# Patient Record
Sex: MALE | Race: BLACK OR AFRICAN AMERICAN | NOT HISPANIC OR LATINO
[De-identification: names, ages, dates, MRNs, and addresses within clinical notes are randomized per-mention and may not be internally consistent; named-entity substitution may affect disease eponyms.]

---

## 2017-06-19 ENCOUNTER — APPOINTMENT (OUTPATIENT)
Dept: ORTHOPEDIC SURGERY | Facility: CLINIC | Age: 62
End: 2017-06-19

## 2021-08-05 ENCOUNTER — APPOINTMENT (OUTPATIENT)
Dept: ORTHOPEDIC SURGERY | Facility: CLINIC | Age: 66
End: 2021-08-05
Payer: MEDICARE

## 2021-08-05 VITALS
HEIGHT: 70 IN | SYSTOLIC BLOOD PRESSURE: 157 MMHG | BODY MASS INDEX: 27.92 KG/M2 | DIASTOLIC BLOOD PRESSURE: 78 MMHG | WEIGHT: 195 LBS | HEART RATE: 58 BPM

## 2021-08-05 DIAGNOSIS — Z82.49 FAMILY HISTORY OF ISCHEMIC HEART DISEASE AND OTHER DISEASES OF THE CIRCULATORY SYSTEM: ICD-10-CM

## 2021-08-05 DIAGNOSIS — Z86.79 PERSONAL HISTORY OF OTHER DISEASES OF THE CIRCULATORY SYSTEM: ICD-10-CM

## 2021-08-05 DIAGNOSIS — Z86.69 PERSONAL HISTORY OF OTHER DISEASES OF THE NERVOUS SYSTEM AND SENSE ORGANS: ICD-10-CM

## 2021-08-05 DIAGNOSIS — Z83.438 FAMILY HISTORY OF OTHER DISORDER OF LIPOPROTEIN METABOLISM AND OTHER LIPIDEMIA: ICD-10-CM

## 2021-08-05 DIAGNOSIS — Z87.39 PERSONAL HISTORY OF OTHER DISEASES OF THE MUSCULOSKELETAL SYSTEM AND CONNECTIVE TISSUE: ICD-10-CM

## 2021-08-05 DIAGNOSIS — Z86.2 PERSONAL HISTORY OF DISEASES OF THE BLOOD AND BLOOD-FORMING ORGANS AND CERTAIN DISORDERS INVOLVING THE IMMUNE MECHANISM: ICD-10-CM

## 2021-08-05 DIAGNOSIS — Z83.3 FAMILY HISTORY OF DIABETES MELLITUS: ICD-10-CM

## 2021-08-05 DIAGNOSIS — Z72.89 OTHER PROBLEMS RELATED TO LIFESTYLE: ICD-10-CM

## 2021-08-05 DIAGNOSIS — Z86.39 PERSONAL HISTORY OF OTHER ENDOCRINE, NUTRITIONAL AND METABOLIC DISEASE: ICD-10-CM

## 2021-08-05 PROCEDURE — 99204 OFFICE O/P NEW MOD 45 MIN: CPT

## 2021-08-05 PROCEDURE — 73502 X-RAY EXAM HIP UNI 2-3 VIEWS: CPT | Mod: RT

## 2021-08-05 RX ORDER — ATENOLOL 50 MG/1
50 TABLET ORAL
Refills: 0 | Status: ACTIVE | COMMUNITY

## 2021-08-05 RX ORDER — COLESEVELAM HYDROCHLORIDE 625 MG/1
625 TABLET, FILM COATED ORAL
Refills: 0 | Status: ACTIVE | COMMUNITY

## 2021-08-05 RX ORDER — BENAZEPRIL HYDROCHLORIDE 20 MG/1
20 TABLET, FILM COATED ORAL
Refills: 0 | Status: ACTIVE | COMMUNITY

## 2021-08-05 NOTE — ADDENDUM
[FreeTextEntry1] : This note was authored by Whitney Overton working as a medical scribe for Dr. Jabier Rees. The note was reviewed, edited, and revised by Dr. Jabier Rees whom is in agreement with the exam findings, imaging findings, and treatment plan. 08/05/2021

## 2021-08-05 NOTE — DISCUSSION/SUMMARY
[de-identified] : The patient is a 66 year old male who presents with right hip pain. Given the significant degeneration of his lumbar spine seen on x-ray, I believe most of his pain stems from his spine. Referral to physiatry for further spinal evaluation was provided. He does have some lateral hip pain consistent with greater trochanteric bursitis as well. Patient may continue taking antiinflammatories as prescribed by his rheumatologist. A prescription for physical therapy was provided. The patient was instructed to learn and continue a home stretching routine to prevent bursitis recurrence.

## 2021-08-05 NOTE — PHYSICAL EXAM
[de-identified] : The patient appears well nourished  and in no apparent distress.  The patient is alert and oriented to person, place, and time.   Affect and mood appear normal. The head is normocephalic and atraumatic.  The eyes reveal normal sclera and extra ocular muscles are intact. The tongue is midline with no apparent lesions.  Skin shows normal turgor with no evidence of eczema or psoriasis.  No respiratory distress noted.  Sensation grossly intact.\par   [de-identified] : Exam of the right hip shows lateral hip pain with hip flexion of 100 degrees, no pain with hip external rotation of 40 degrees, hip internal rotation of 25 degrees. Tenderness to palpation over the GT bursa, less tenderness over the gluteal musculature, no pain over the ischial tuberosity.  SLR without difficulty. 5/5 motor strength bilaterally distally. Sensation intact distally. [de-identified] : X-ray: AP view of the pelvis and 2 views of the right hip demonstrate well preserved hip joint space. There is significant arthritis of the right lumbar spine seen.

## 2021-08-05 NOTE — HISTORY OF PRESENT ILLNESS
[de-identified] : Patient presents for evaluation of right hip/buttock pain.  He notes his pain has been longstanding and is now worsening.  His pain is localized posterolaterally.  He denies any pain radiating down the leg.  Patient denies any groin pain at this time.  He admits to history of lower back pain.  His symptoms are worse with long periods of standing.  He has not had any injections.  Patient has tried physical therapy without significant improvement.  Patient takes anti-inflammatories and Tylenol without significant improvement.\par Surgical history includes rheumatoid arthritis.  He is unsure which medications he takes for this.

## 2021-08-05 NOTE — CONSULT LETTER
[Dear  ___] : Dear  [unfilled], [Consult Letter:] : I had the pleasure of evaluating your patient, [unfilled]. [Please see my note below.] : Please see my note below. [Consult Closing:] : Thank you very much for allowing me to participate in the care of this patient.  If you have any questions, please do not hesitate to contact me. [Sincerely,] : Sincerely, [FreeTextEntry2] : ABDIAZIZ LATHAM MD\par  [FreeTextEntry3] : Jabier Rees MD\par Chief of Joint Replacement\par [facility] \par [facility]\par \par

## 2021-09-01 ENCOUNTER — OUTPATIENT (OUTPATIENT)
Dept: OUTPATIENT SERVICES | Facility: HOSPITAL | Age: 66
LOS: 1 days | End: 2021-09-01
Payer: COMMERCIAL

## 2021-09-01 ENCOUNTER — APPOINTMENT (OUTPATIENT)
Dept: PHYSICAL MEDICINE AND REHAB | Facility: CLINIC | Age: 66
End: 2021-09-01
Payer: MEDICARE

## 2021-09-01 ENCOUNTER — RESULT REVIEW (OUTPATIENT)
Age: 66
End: 2021-09-01

## 2021-09-01 VITALS
RESPIRATION RATE: 14 BRPM | SYSTOLIC BLOOD PRESSURE: 135 MMHG | HEART RATE: 58 BPM | BODY MASS INDEX: 27.63 KG/M2 | WEIGHT: 193 LBS | DIASTOLIC BLOOD PRESSURE: 78 MMHG | HEIGHT: 70 IN

## 2021-09-01 DIAGNOSIS — Z86.39 PERSONAL HISTORY OF OTHER ENDOCRINE, NUTRITIONAL AND METABOLIC DISEASE: ICD-10-CM

## 2021-09-01 DIAGNOSIS — M47.816 SPONDYLOSIS WITHOUT MYELOPATHY OR RADICULOPATHY, LUMBAR REGION: ICD-10-CM

## 2021-09-01 DIAGNOSIS — Z78.9 OTHER SPECIFIED HEALTH STATUS: ICD-10-CM

## 2021-09-01 PROCEDURE — 99204 OFFICE O/P NEW MOD 45 MIN: CPT

## 2021-09-01 PROCEDURE — 72100 X-RAY EXAM L-S SPINE 2/3 VWS: CPT | Mod: 26

## 2021-09-01 NOTE — PHYSICAL EXAM
[FreeTextEntry1] : PE:\par Constitutional: In NAD, calm and cooperative\par HEENT: NCAT, Anicteric sclera, no lid-lag\par Cardio: Extremities appear pink and well perfused, no peripheral edema\par Respiratory: Normal respiratory effort on room air, no accessory muscle use\par Skin: no rashes seen on exposed skin, no visible abrasions\par Psych: Normal affect, intact judgment and insight\par Neuro: Awake, alert and oriented x 3, see below for focused neurological exam\par MSK (Back)\par 	Inspection: no gross swelling identified\par 	Palpation: Tenderness of the right lower lumbar paraspinals/upper buttocks area\par 	ROM: Pain at end lumbar extension>flexion\par 	Strength: 5/5 strength in bilateral lower extremities\par 	Reflexes: 2+ Patella reflex bilaterally, 2+ Achilles reflex bilaterally, negative clonus bilaterally\par 	Sensation: Intact to light touch in bilateral lower extremities\par Special tests:\par SLR:negative bilaterally\par FRANCINE:negative bilaterally\par FADIR: negative bilaterally\par Facet loading: positive on right

## 2021-09-01 NOTE — HISTORY OF PRESENT ILLNESS
[FreeTextEntry1] : Mr. DANIELA TABARES is a 66 year old male who presents with low back pain. Patient was referred by Dr. Rees. \par \par Location:R hip/buttock/low back\par Onset:Chronic, years, no specific triggering event\par Provocation/Palliative:Worse with prolonged standing/activity, better with rest\par Quality:Aching, but can be sharp\par Radiation:No radiation down leg\par Severity:minimal with rest, can be moderate with standing\par Timing:Gradually worsening with time\par \par Denies any associated numbness. Denies any associated leg weakness. Denies any loss of bowel/bladder control or any groin numbness.\par Previous medications trialed:Tylenol, NSAIDs\par Previous procedures relevant to complaint:None\par Conservative therapy tried?:Will be starting PT this week

## 2021-09-01 NOTE — DATA REVIEWED
[FreeTextEntry1] : X-ray Pelvis 8/5/21 taken by [facility] reviewed by me: moderate degenerative changes of lower lumbar spine.

## 2021-09-01 NOTE — ASSESSMENT
[FreeTextEntry1] : Mr. DANIELA TABARES is a 66 year old male who presents with chronic R sided lower back/buttock pain, consistent with lumbar facet joint pain with degenerative changes seen on pelvis X-ray. Denies any radicular pain at this time. Denies any red flag signs. Will recommend:\par - [facility] notes reviewed\par - Dedicated X-ray LSpine to evaluate for degenerative changes\par - Patient to start PT this week as given by Dr. Rees\par - Patient to take OTC NSAIDs/Tylenol PRN, he rarely needs to take medication at this time. Patient aware of risks and side effects. \par \par RTC in 1 month. If no significant improvement, can consider advanced imaging of lumbar spine. Patient aware of red flag signs including any changes to their bowel/bladder control, groin numbness or new weakness. Patient knows to seek immediate attention by calling 911 or going to nearest ER if these symptoms appear.

## 2021-10-06 ENCOUNTER — APPOINTMENT (OUTPATIENT)
Dept: PHYSICAL MEDICINE AND REHAB | Facility: CLINIC | Age: 66
End: 2021-10-06
Payer: MEDICARE

## 2021-10-06 VITALS
RESPIRATION RATE: 14 BRPM | BODY MASS INDEX: 27.92 KG/M2 | HEIGHT: 70 IN | WEIGHT: 195 LBS | HEART RATE: 54 BPM | DIASTOLIC BLOOD PRESSURE: 81 MMHG | SYSTOLIC BLOOD PRESSURE: 159 MMHG

## 2021-10-06 DIAGNOSIS — M25.551 PAIN IN RIGHT HIP: ICD-10-CM

## 2021-10-06 PROCEDURE — 99214 OFFICE O/P EST MOD 30 MIN: CPT

## 2021-10-06 RX ORDER — NAPROXEN 500 MG/1
500 TABLET ORAL
Qty: 28 | Refills: 0 | Status: ACTIVE | COMMUNITY
Start: 2021-10-06 | End: 1900-01-01

## 2021-10-06 NOTE — HISTORY OF PRESENT ILLNESS
[FreeTextEntry1] : Mr. DANIELA TABARES is a 66 year old  male who presents for follow up. At last visit, X-ray of LSpine was ordered, he was started on PT. He feels about the same, no significant improvement, pain has persisted. No new symptoms. He occasionally takes a medication from rheumatologist but is unsure which it is. \par \par Location:R hip/buttock/low back\par Onset:Chronic, years, no specific triggering event\par Provocation/Palliative:Worse with prolonged standing/activity, better with rest\par Quality:Aching, but can be sharp\par Radiation:No radiation down leg\par Severity:minimal with rest, can be moderate with standing\par Timing:Gradually worsening with time\par \par No bowel/bladder changes. No groin numbness.

## 2021-10-06 NOTE — ASSESSMENT
[FreeTextEntry1] : Mr. DANIELA TABARES is a 66 year old male who presents with chronic R sided lower back/buttock pain, consistent with lumbar facet joint pain with degenerative changes seen on pelvis X-ray. Denies any radicular pain at this time. Denies any red flag signs. Will recommend:\par - Given pain persisted 6+ weeks despite conservative care under physician guidelines, will order MRI L Spine\par - Patient to continue PT\par - Patient to find out which medication he takes from his rheumatologist. In meantime will give Naproxen 500mg BID PRN. Patient advised on cardiac/gi/renal side effects. Patient encouraged to take medication with food and not with other NSAIDs. Patient will call me once he figures out what medication the rheumatologist gives him before taking the Naproxen. Patient in agreement with plan. \par \par RTC in 1 month.. Patient aware of red flag signs including any changes to their bowel/bladder control, groin numbness or new weakness. Patient knows to seek immediate attention by calling 911 or going to nearest ER if these symptoms appear.

## 2021-10-06 NOTE — DATA REVIEWED
[FreeTextEntry1] : \par  XR LUMBAR SPINE AP AND LATERAL 2 OR 3 VIEWS             Final\par \par No Documents Attached\par \par \par \par \par   EXAM:  XR LS SPINE AP LAT 2-3 VIEWS\par \par \par PROCEDURE DATE:  09/01/2021\par \par \par \par INTERPRETATION:  CLINICAL INDICATION: right lower back pain\par \par EXAM:\par AP lateral lumbosacral spine from 9/1/2021 at 1403. Compared to appearance on abdomen/pelvis CT from 9/20/2009.\par \par IMPRESSION:\par No compression fractures, spondylolistheses, or spondylolysis defects.\par \par Notable L4-L5 level disc margin osteophytes. Otherwise preserved intervertebral disc spaces.\par \par Unremarkable SI joints and partially visualized hips.\par \par No lytic or blastic lesions.\par \par --- End of Report ---\par \par \par \par \par \par \par LAURA MUSE MD; Attending Radiologist\par This document has been electronically signed. Sep  1 2021  4:51PM\par \par  \par \par  Ordered by: CRISTINA CASTILLO       Collected/Examined: 01Sep2021 02:03PM       \par Verified by: CRISTINA CASTILLO 01Sep2021 05:07PM       \par  Result Communication: No patient communication needed at this time;\par Stage: Final       \par  Performed at: [facility]       Resulted: 01Sep2021 04:50PM       Last Updated: 01Sep2021 05:07PM       Accession: [id number]

## 2021-10-20 ENCOUNTER — APPOINTMENT (OUTPATIENT)
Dept: MRI IMAGING | Facility: CLINIC | Age: 66
End: 2021-10-20
Payer: MEDICARE

## 2021-10-20 ENCOUNTER — OUTPATIENT (OUTPATIENT)
Dept: OUTPATIENT SERVICES | Facility: HOSPITAL | Age: 66
LOS: 1 days | End: 2021-10-20

## 2021-10-20 DIAGNOSIS — M54.16 RADICULOPATHY, LUMBAR REGION: ICD-10-CM

## 2021-10-20 PROCEDURE — 72148 MRI LUMBAR SPINE W/O DYE: CPT | Mod: 26

## 2021-11-10 ENCOUNTER — APPOINTMENT (OUTPATIENT)
Dept: PHYSICAL MEDICINE AND REHAB | Facility: CLINIC | Age: 66
End: 2021-11-10
Payer: COMMERCIAL

## 2021-11-10 VITALS
HEIGHT: 70 IN | BODY MASS INDEX: 27.92 KG/M2 | WEIGHT: 195 LBS | SYSTOLIC BLOOD PRESSURE: 110 MMHG | RESPIRATION RATE: 14 BRPM | DIASTOLIC BLOOD PRESSURE: 80 MMHG | HEART RATE: 60 BPM

## 2021-11-10 PROCEDURE — 99214 OFFICE O/P EST MOD 30 MIN: CPT

## 2021-11-10 NOTE — HISTORY OF PRESENT ILLNESS
[FreeTextEntry1] : Mr. DANIELA TABARES is a 66 year old  male who presents for follow up. At last visit, patient was ordered an MRI L Spine, continued on PT and found out the medication he takes from rheumatology is duexis but since it wasn't helping, he tried Naproxen instead. He overall feels a little better, takes Naproxen occasionally. \par \par Location:R hip/buttock/low back\par Onset:Chronic, years, no specific triggering event\par Provocation/Palliative:Worse with prolonged standing/activity, better with rest\par Quality:Aching, but can be sharp\par Radiation:No radiation down leg\par Severity:minimal with rest, can be moderate with standing\par Timing:Gradually worsening with time\par \par No bowel/bladder changes. No groin numbness.

## 2021-11-10 NOTE — DATA REVIEWED
[FreeTextEntry1] : \par   MR Lumbar Spine No Cont             Final\par \par No Documents Attached\par \par \par Result Annotated 25Oct2021 09:36AM by CRISTINA CASTILLO\par \par \par Discussed with patient. He will see PCP regarding renal cysts. Will f/u with me in clinic\par \par \par   EXAM:  MR SPINE LUMBAR\par \par \par PROCEDURE DATE:  10/20/2021\par \par \par \par INTERPRETATION:  EXAMINATION: MR LUMBAR SPINE\par \par CLINICAL INDICATION: Persistent right-sided low back pain.\par \par TECHNIQUE: Multi-planar, multi-sequence MR of the lumbar spine was performed without intravenous contrast.\par \par COMPARISON: CT lumbar spine dated 9/20/2009 and radiographs lumbar spine dated 9/1/2021\par \par INTERPRETATION:\par \par BONES AND BONE MARROW:  There is no evidence of fracture. There is an unchanged density within the L1 vertebra 2009, likely to represent a bone island. There is L3 and L4 small hemangiomas present.\par INTERVERTEBRAL DISCS:  There is disc desiccation at L3-4 through L5-S1. Other findings as below.\par ALIGNMENT:  The spinal alignment is maintained.\par CONUS AND CAUDA EQUINA: The conus medullaris is normal in size, signal and location.  The conus terminates at the L1 level.\par EXTRASPINAL: There is no epidural mass or fluid collection. There is severe bilateral paraspinal muscle atrophy. There are bilateral renal cysts.\par \par Evaluation of individual lumbar disc space levels demonstrates the following:\par \par L1/L2, There is no significant spinal canal or neural foraminal stenosis.\par L2/L3, broad-based posterior disc herniation is present with mild facet arthropathy. There is mild bilateral neural foraminal stenosis. No central stenosis.\par L3/L4, broad-based posterior disc herniation is present with moderate facet arthropathy. There is an annular tear centrally. There is moderate bilateral neural foraminal stenosis. Mild central stenosis.\par L4/L5, broad-based posterior disc herniation is present with moderate to severe facet arthropathy. There is severe right and moderate left neural foraminal stenosis. Severe central stenosis.\par L5/S1, broad-based posterior disc herniation is present with moderate facet arthropathy. There is a right paracentral annular tear. There is moderate bilateral neural foraminal stenosis. Moderate central stenosis.\par \par IMPRESSION:\par Multilevel lumbar spondylosis notable at L3-4, L4-5, and L5-S1.\par \par At L3/L4 there is moderate bilateral neural foraminal stenosis and mild central stenosis.\par \par At L4/L5, there is severe right and moderate left neural foraminal stenosis and severe central stenosis.\par \par At L5/S1, there is moderate bilateral neural foraminal stenosis and moderate central stenosis.\par \par Severe bilateral paraspinal muscle atrophy.\par \par \par --- End of Report ---\par \par \par \par \par \par \par SHLOMIT GOLDBERG-STEIN MD; Attending Radiologist\par This document has been electronically signed. Oct 22 2021  5:21PM\par \par  \par \par  Ordered by: CRISTINA CASTILLO       Collected/Examined: 20Oct2021 10:29AM       \par Verified by: CRISTINA CASTILLO 25Oct2021 09:36AM       \par  Result Communication: Discussed results with patient;\par Stage: Final       \par  Performed at: [facility]       Resulted: 22Oct2021 04:41PM       Last Updated: 25Oct2021 09:36AM       Accession: [id number]

## 2021-11-10 NOTE — ASSESSMENT
[FreeTextEntry1] : Mr. DANIELA TABARES is a 66 year old male who presents with chronic R sided lower back/buttock pain, consistent with lumbar facet joint pain with degenerative changes seen on MRI. There is also likely a myofascial component.  Although patient has areas of severe stenosis, he denies any radicular pain or neurogenic claudication at this time. Denies any red flag signs. Will recommend:\par - Patient to continue PT but will now focus more on core strengthening\par - May continue Naproxen PRN. Patient aware of risks/benefits and knows not to take other NSAIDs with it. \par - Offered patient a consultation with a spine surgeon but he does not want to speak to a spine surgeon at this time. Risks and benefits explained to patient. \par \par RTC in 1 month. Patient aware of red flag signs including any changes to their bowel/bladder control, groin numbness or new weakness. Patient knows to seek immediate attention by calling 911 or going to nearest ER if these symptoms appear.

## 2021-11-10 NOTE — PHYSICAL EXAM
[FreeTextEntry1] : PE:\par Constitutional: In NAD, calm and cooperative\par HEENT: NCAT, Anicteric sclera, no lid-lag\par Cardio: Extremities appear pink and well perfused, no peripheral edema\par Respiratory: Normal respiratory effort on room air, no accessory muscle use\par Skin: no rashes seen on exposed skin, no visible abrasions\par Psych: Normal affect, intact judgment and insight\par Neuro: Awake, alert and oriented x 3, see below for focused neurological exam\par MSK (Back)\par 	Inspection: no gross swelling identified\par 	Palpation: Mild tenderness of the right lower lumbar paraspinals/upper buttocks area\par 	ROM: Pain at end lumbar extension>flexion\par 	Strength: 5/5 strength in bilateral lower extremities\par 	Reflexes: 2+ Patella reflex bilaterally, 2+ Achilles reflex bilaterally, negative clonus bilaterally\par 	Sensation: Intact to light touch in bilateral lower extremities\par Special tests:\par SLR:negative bilaterally\par FRANCINE:negative bilaterally\par FADIR: negative bilaterally\par Facet loading: positive on right

## 2021-12-10 ENCOUNTER — APPOINTMENT (OUTPATIENT)
Dept: PHYSICAL MEDICINE AND REHAB | Facility: CLINIC | Age: 66
End: 2021-12-10

## 2022-01-21 ENCOUNTER — APPOINTMENT (OUTPATIENT)
Dept: PHYSICAL MEDICINE AND REHAB | Facility: CLINIC | Age: 67
End: 2022-01-21
Payer: MEDICARE

## 2022-01-21 VITALS
RESPIRATION RATE: 12 BRPM | BODY MASS INDEX: 27.63 KG/M2 | SYSTOLIC BLOOD PRESSURE: 130 MMHG | HEIGHT: 70 IN | DIASTOLIC BLOOD PRESSURE: 75 MMHG | WEIGHT: 193 LBS | HEART RATE: 55 BPM

## 2022-01-21 PROCEDURE — 99213 OFFICE O/P EST LOW 20 MIN: CPT | Mod: GC

## 2022-01-24 NOTE — PHYSICAL EXAM
[FreeTextEntry1] : PE:\par Constitutional: In NAD, calm and cooperative\par HEENT: NCAT, Anicteric sclera, no lid-lag\par Cardio: Extremities appear pink and well perfused, no peripheral edema\par Respiratory: Normal respiratory effort on room air, no accessory muscle use\par Skin: no rashes seen on exposed skin, no visible abrasions\par Psych: Normal affect, intact judgment and insight\par Neuro: Awake, alert and oriented x 3, see below for focused neurological exam\par MSK (Back)\par 	Inspection: no gross swelling identified\par 	Palpation: Mild tenderness of the right lower lumbar paraspinals/upper buttocks area\par 	ROM: Mild pain at end lumbar extension>flexion\par 	Strength: 5/5 strength in bilateral lower extremities\par 	Reflexes: 2+ Patella reflex bilaterally, 2+ Achilles reflex bilaterally, negative clonus bilaterally\par 	Sensation: Intact to light touch in bilateral lower extremities\par Special tests:\par SLR:negative bilaterally\par FRANCINE:negative bilaterally\par FADIR: negative bilaterally\par Facet loading: positive on right

## 2022-01-24 NOTE — ASSESSMENT
[FreeTextEntry1] : Mr. DANIELA TABARES is a 66 year old male who presents with chronic R sided lower back/buttock pain, consistent with lumbar facet joint pain with degenerative changes seen on MRI. There is also likely a myofascial component.  Although patient has areas of severe stenosis, he denies any radicular pain or neurogenic claudication at this time. Denies any red flag signs. Clinically improving with conservative management.\par \par Will recommend:\par - Patient to continue PT but will now focus more on core strengthening, then transition to HEP.\par - May continue Naproxen PRN. Patient aware of risks/benefits and knows not to take other NSAIDs with it. \par - Patient still not interested in surgical consultation for his stenosis. \par \par RTC 5-6 weeks. Patient aware of red flag signs including any changes to their bowel/bladder control, groin numbness or new weakness. Patient knows to seek immediate attention by calling 911 or going to nearest ER if these symptoms appear.

## 2022-01-24 NOTE — HISTORY OF PRESENT ILLNESS
[FreeTextEntry1] : Mr. DANIELA TABARES is a 66 year old  male who presents for follow up. At last visit, continued on PT.  He overall feels much better, takes Naproxen occasionally.  The one place he occasionally feels pain in the right hip, but only if he stands for a prolonged period of time. Denies any new symptoms. \par \par Location:R hip/buttock/low back\par Onset:Chronic, years, no specific triggering event\par Provocation/Palliative:Worse with prolonged standing/activity, better with rest\par Quality:Aching, but can be sharp\par Radiation:No radiation down leg\par Severity:minimal with rest, can be mild with standing\par Timing:Improving with PT\par \par No bowel/bladder changes. No groin numbness.

## 2022-01-24 NOTE — DATA REVIEWED
[FreeTextEntry1] : \par   MR Lumbar Spine No Cont             Final\par \par No Documents Attached\par \par \par Result Annotated 25Oct2021 09:36AM by CRISTINA CASTILLO\par \par \par Discussed with patient. He will see PCP regarding renal cysts. Will f/u with me in clinic\par \par \par   EXAM:  MR SPINE LUMBAR\par \par \par PROCEDURE DATE:  10/20/2021\par \par \par \par INTERPRETATION:  EXAMINATION: MR LUMBAR SPINE\par \par CLINICAL INDICATION: Persistent right-sided low back pain.\par \par TECHNIQUE: Multi-planar, multi-sequence MR of the lumbar spine was performed without intravenous contrast.\par \par COMPARISON: CT lumbar spine dated 9/20/2009 and radiographs lumbar spine dated 9/1/2021\par \par INTERPRETATION:\par \par BONES AND BONE MARROW:  There is no evidence of fracture. There is an unchanged density within the L1 vertebra 2009, likely to represent a bone island. There is L3 and L4 small hemangiomas present.\par INTERVERTEBRAL DISCS:  There is disc desiccation at L3-4 through L5-S1. Other findings as below.\par ALIGNMENT:  The spinal alignment is maintained.\par CONUS AND CAUDA EQUINA: The conus medullaris is normal in size, signal and location.  The conus terminates at the L1 level.\par EXTRASPINAL: There is no epidural mass or fluid collection. There is severe bilateral paraspinal muscle atrophy. There are bilateral renal cysts.\par \par Evaluation of individual lumbar disc space levels demonstrates the following:\par \par L1/L2, There is no significant spinal canal or neural foraminal stenosis.\par L2/L3, broad-based posterior disc herniation is present with mild facet arthropathy. There is mild bilateral neural foraminal stenosis. No central stenosis.\par L3/L4, broad-based posterior disc herniation is present with moderate facet arthropathy. There is an annular tear centrally. There is moderate bilateral neural foraminal stenosis. Mild central stenosis.\par L4/L5, broad-based posterior disc herniation is present with moderate to severe facet arthropathy. There is severe right and moderate left neural foraminal stenosis. Severe central stenosis.\par L5/S1, broad-based posterior disc herniation is present with moderate facet arthropathy. There is a right paracentral annular tear. There is moderate bilateral neural foraminal stenosis. Moderate central stenosis.\par \par IMPRESSION:\par Multilevel lumbar spondylosis notable at L3-4, L4-5, and L5-S1.\par \par At L3/L4 there is moderate bilateral neural foraminal stenosis and mild central stenosis.\par \par At L4/L5, there is severe right and moderate left neural foraminal stenosis and severe central stenosis.\par \par At L5/S1, there is moderate bilateral neural foraminal stenosis and moderate central stenosis.\par \par Severe bilateral paraspinal muscle atrophy.\par \par \par --- End of Report ---\par \par \par \par \par \par \par SHLOMIT GOLDBERG-STEIN MD; Attending Radiologist\par This document has been electronically signed. Oct 22 2021  5:21PM\par \par  \par \par  Ordered by: CRISTINA CASTILLO       Collected/Examined: 20Oct2021 10:29AM       \par Verified by: CRISTINA CASTILLO 25Oct2021 09:36AM       \par  Result Communication: Discussed results with patient;\par Stage: Final       \par  Performed at: [facility]       Resulted: 22Oct2021 04:41PM       Last Updated: 25Oct2021 09:36AM       Accession: [id number]

## 2022-03-11 ENCOUNTER — APPOINTMENT (OUTPATIENT)
Dept: PHYSICAL MEDICINE AND REHAB | Facility: CLINIC | Age: 67
End: 2022-03-11
Payer: MEDICARE

## 2022-03-11 VITALS
HEIGHT: 70 IN | WEIGHT: 190 LBS | DIASTOLIC BLOOD PRESSURE: 84 MMHG | SYSTOLIC BLOOD PRESSURE: 168 MMHG | RESPIRATION RATE: 14 BRPM | HEART RATE: 54 BPM | BODY MASS INDEX: 27.2 KG/M2

## 2022-03-11 DIAGNOSIS — M79.18 MYALGIA, OTHER SITE: ICD-10-CM

## 2022-03-11 PROCEDURE — 99214 OFFICE O/P EST MOD 30 MIN: CPT

## 2022-03-11 NOTE — ASSESSMENT
[FreeTextEntry1] : Mr. DANIELA TABARES is a 66 year old male who presents with chronic R sided lower back/buttock pain, consistent with lumbar facet joint pain with degenerative changes seen on MRI. There is also likely a myofascial component.  Although patient has areas of severe stenosis, he denies any radicular pain or neurogenic claudication at this time. Denies any red flag signs. Clinically improving with conservative management.\par \par Will recommend:\par - Discussed with patient the risks (including but not limited to bleeding, infection, nerve damage, etc), benefits and alternatives to a medial branch block injection for which patient understands. Will plan for a right L4-5 and L5-S1 MBB. He will need PCP clearance and COVID PCR done. \par - May continue Naproxen PRN. Patient aware of risks/benefits and knows not to take other NSAIDs with it. \par - Patient still not interested in surgical consultation for his stenosis. \par \par Return for procedure. Patient aware of red flag signs including any changes to their bowel/bladder control, groin numbness or new weakness. Patient knows to seek immediate attention by calling 911 or going to nearest ER if these symptoms appear.

## 2022-03-11 NOTE — HISTORY OF PRESENT ILLNESS
[FreeTextEntry1] : Mr. DANIELA TABARES is a 67 year old  male who presents for follow up. At last visit, he was continued on PT, Naproxen PRN. Overall he feels about the same. He has now finished PT, without significant benefit. \par \par Location:R hip/buttock/low back\par Onset:Chronic, years, no specific triggering event\par Provocation/Palliative:Worse with prolonged standing/activity, better with rest\par Quality:Aching, but can be sharp\par Radiation:No radiation down leg\par Severity:minimal with rest, can be mild-moderate with standing\par Timing:Has been stayed about the same. \par \par No bowel/bladder changes. No groin numbness.

## 2022-03-11 NOTE — PHYSICAL EXAM
[FreeTextEntry1] : PE:\par Constitutional: In NAD, calm and cooperative\par HEENT: NCAT, Anicteric sclera, no lid-lag\par Cardio: Extremities appear pink and well perfused, no peripheral edema\par Respiratory: Normal respiratory effort on room air, no accessory muscle use\par Skin: no rashes seen on exposed skin, no visible abrasions\par Psych: Normal affect, intact judgment and insight\par Neuro: Awake, alert and oriented x 3, see below for focused neurological exam\par MSK (Back)\par 	Inspection: no gross swelling identified\par 	Palpation: Mild tenderness of the right lower lumbar paraspinals/upper buttocks area\par 	ROM: Mild pain at end lumbar extension>flexion\par 	Strength: 5/5 strength in bilateral lower extremities\par 	Reflexes: 2+ Patella reflex bilaterally, 2+ Achilles reflex bilaterally, negative clonus bilaterally\par 	Sensation: Intact to light touch in bilateral lower extremities\par Special tests:\par SLR:negative bilaterally\par Facet loading: positive on right

## 2022-04-07 ENCOUNTER — APPOINTMENT (OUTPATIENT)
Dept: PHYSICAL MEDICINE AND REHAB | Facility: AMBULATORY SURGERY CENTER | Age: 67
End: 2022-04-07
Payer: MEDICARE

## 2022-04-07 PROCEDURE — 64493 INJ PARAVERT F JNT L/S 1 LEV: CPT | Mod: RT

## 2022-04-07 PROCEDURE — 64494 INJ PARAVERT F JNT L/S 2 LEV: CPT | Mod: RT

## 2022-05-18 ENCOUNTER — APPOINTMENT (OUTPATIENT)
Dept: PHYSICAL MEDICINE AND REHAB | Facility: CLINIC | Age: 67
End: 2022-05-18
Payer: MEDICARE

## 2022-05-18 PROCEDURE — 99214 OFFICE O/P EST MOD 30 MIN: CPT

## 2022-05-18 NOTE — ASSESSMENT
[FreeTextEntry1] : Mr. DANIELA TABARES is a 66 year old male who presents with chronic R sided lower back/buttock pain, which was thought to be more from his underlying spondylosis but he did not get significant relief from the MBB. Of note, he now complains of more pain down his RLE. Given his significant stenosis on MRI, he may have a radicular component now. Denies any red flag signs. Clinically improving with conservative management.\par \par Will recommend:\par - Discussed with patient the risks (including but not limited to bleeding, infection, nerve damage, etc), benefits and alternatives to an epidural steroid injection for which patient understands. Will plan for a R L5-S1 TFESI. He will need PCP clearance and COVID PCR done. \par - Advised he can take Tylenol PRN for now, up to 3g/day. \par - Patient still not interested in surgical consultation for his stenosis. \par \par Return for procedure. Patient aware of red flag signs including any changes to their bowel/bladder control, groin numbness or new weakness. Patient knows to seek immediate attention by calling 911 or going to nearest ER if these symptoms appear.

## 2022-05-18 NOTE — PHYSICAL EXAM
[FreeTextEntry1] : PE:\par MSK (Back)\par 	Inspection: no gross swelling identified, no erythema or swelling around injection site \par 	Palpation: Mild tenderness of the right lower lumbar paraspinals/upper buttocks area\par 	ROM: Mild pain at end lumbar flexion>extension\par 	Strength: 5/5 strength in bilateral lower extremities\par 	Reflexes: 2+ Patella reflex bilaterally, 2+ Achilles reflex bilaterally, negative clonus bilaterally\par 	Sensation: Intact to light touch in bilateral lower extremities\par Special tests:\par SLR:equivocal on right, negative on left\par Facet loading: positive on right

## 2022-05-18 NOTE — HISTORY OF PRESENT ILLNESS
[FreeTextEntry1] : Mr. DANIELA TABARES is a 67 year old  male who presents for follow up. He is s/p a R L4-5 and L5-S1 MBB on 4/7/22. He only got 40% relief for the first day after procedure. He now says he is back to baseline. He also says his L side has been acting up as well, but not nearly as worse as his R side. \par \par Location:R>L hip/buttock/low back\par Onset:Chronic, years, no specific triggering event\par Provocation/Palliative:Worse with prolonged standing/activity, better with rest\par Quality:Aching, but can be sharp\par Radiation:Can radiate down to his R lateral thigh area\par Severity:minimal with rest, can be mild-moderate with standing\par Timing:Has been stayed about the same. \par \par No bowel/bladder changes. No groin numbness.

## 2022-06-10 ENCOUNTER — EMERGENCY (EMERGENCY)
Facility: HOSPITAL | Age: 67
LOS: 1 days | Discharge: HOME | End: 2022-06-10
Attending: STUDENT IN AN ORGANIZED HEALTH CARE EDUCATION/TRAINING PROGRAM
Payer: MEDICARE

## 2022-06-10 VITALS
WEIGHT: 190.04 LBS | RESPIRATION RATE: 18 BRPM | OXYGEN SATURATION: 98 % | HEIGHT: 71 IN | HEART RATE: 58 BPM | DIASTOLIC BLOOD PRESSURE: 81 MMHG | SYSTOLIC BLOOD PRESSURE: 165 MMHG | TEMPERATURE: 98 F

## 2022-06-10 LAB
APPEARANCE UR: CLEAR — SIGNIFICANT CHANGE UP
BILIRUB UR-MCNC: NEGATIVE — SIGNIFICANT CHANGE UP
COLOR SPEC: YELLOW — SIGNIFICANT CHANGE UP
DIFF PNL FLD: NEGATIVE — SIGNIFICANT CHANGE UP
GLUCOSE UR QL: NEGATIVE MG/DL — SIGNIFICANT CHANGE UP
KETONES UR-MCNC: NEGATIVE — SIGNIFICANT CHANGE UP
LEUKOCYTE ESTERASE UR-ACNC: NEGATIVE — SIGNIFICANT CHANGE UP
NITRITE UR-MCNC: NEGATIVE — SIGNIFICANT CHANGE UP
PH UR: 6 — SIGNIFICANT CHANGE UP (ref 5–8)
PROT UR-MCNC: NEGATIVE — SIGNIFICANT CHANGE UP
SP GR SPEC: 1.01 — SIGNIFICANT CHANGE UP (ref 1.01–1.02)
UROBILINOGEN FLD QL: NEGATIVE MG/DL — SIGNIFICANT CHANGE UP

## 2022-06-10 PROCEDURE — 96372 THER/PROPH/DIAG INJ SC/IM: CPT

## 2022-06-10 PROCEDURE — 99284 EMERGENCY DEPT VISIT MOD MDM: CPT

## 2022-06-10 PROCEDURE — 81003 URINALYSIS AUTO W/O SCOPE: CPT

## 2022-06-10 PROCEDURE — 87086 URINE CULTURE/COLONY COUNT: CPT

## 2022-06-10 PROCEDURE — 99283 EMERGENCY DEPT VISIT LOW MDM: CPT | Mod: 25

## 2022-06-10 RX ORDER — METHOCARBAMOL 500 MG/1
1 TABLET, FILM COATED ORAL
Qty: 15 | Refills: 0
Start: 2022-06-10 | End: 2022-06-14

## 2022-06-10 RX ORDER — LIDOCAINE 4 G/100G
1 CREAM TOPICAL ONCE
Refills: 0 | Status: COMPLETED | OUTPATIENT
Start: 2022-06-10 | End: 2022-06-10

## 2022-06-10 RX ORDER — KETOROLAC TROMETHAMINE 30 MG/ML
30 SYRINGE (ML) INJECTION ONCE
Refills: 0 | Status: DISCONTINUED | OUTPATIENT
Start: 2022-06-10 | End: 2022-06-10

## 2022-06-10 RX ORDER — METHOCARBAMOL 500 MG/1
750 TABLET, FILM COATED ORAL ONCE
Refills: 0 | Status: COMPLETED | OUTPATIENT
Start: 2022-06-10 | End: 2022-06-10

## 2022-06-10 RX ADMIN — Medication 30 MILLIGRAM(S): at 07:50

## 2022-06-10 RX ADMIN — METHOCARBAMOL 750 MILLIGRAM(S): 500 TABLET, FILM COATED ORAL at 07:50

## 2022-06-10 RX ADMIN — LIDOCAINE 1 PATCH: 4 CREAM TOPICAL at 07:50

## 2022-06-10 NOTE — ED PROVIDER NOTE - NSFOLLOWUPINSTRUCTIONS_ED_ALL_ED_FT
Back Pain    Back pain is very common in adults. The cause of back pain is rarely dangerous and the pain often gets better over time. The cause of your back pain may not be known and may include strain of muscles or ligaments, degeneration of the spinal disks, or arthritis. Occasionally the pain may radiate down your leg(s). Over-the-counter medicines to reduce pain and inflammation are often the most helpful. Stretching and remaining active frequently helps the healing process.     SEEK IMMEDIATE MEDICAL CARE IF YOU HAVE ANY OF THE FOLLOWING SYMPTOMS: bowel or bladder control problems, unusual weakness or numbness in your arms or legs, nausea or vomiting, abdominal pain, fever, dizziness/lightheadedness. TAKE MEDICATION AS PRESCRIBED   NOTE / METHOCARBAMOL IS MUSCLE RELAXANT WILL MAKE YOU SLEEPY DO NOT USE WHILE DRIVING   FOLLOW UP WITH PRIMARY CARE WITHIN 2DAYS   FOLLOW UP WITH SPINE AS NEED IT   Back Pain    Back pain is very common in adults. The cause of back pain is rarely dangerous and the pain often gets better over time. The cause of your back pain may not be known and may include strain of muscles or ligaments, degeneration of the spinal disks, or arthritis. Occasionally the pain may radiate down your leg(s). Over-the-counter medicines to reduce pain and inflammation are often the most helpful. Stretching and remaining active frequently helps the healing process.     SEEK IMMEDIATE MEDICAL CARE IF YOU HAVE ANY OF THE FOLLOWING SYMPTOMS: bowel or bladder control problems, unusual weakness or numbness in your arms or legs, nausea or vomiting, abdominal pain, fever, dizziness/lightheadedness.

## 2022-06-10 NOTE — ED PROVIDER NOTE - CHIEF COMPLAINT
Admission Date: 06/20/2017  Discharge Date: 06/22/2017    DISPOSITION:  The patient is discharged home.    DISCHARGE DIAGNOSES:  1.  Hyperosmolar hyperglycemic nonketotic syndrome.  2.  Uncontrolled diabetes mellitus type 2.  3.  Perioral numbness/tingling secondary to above.  4.  Dehydration.  5.  Hyponatremia.  6.  Skin lacerations.    ADMITTING PHYSICIAN:  America Sal MD    DISCHARGING PHYSICIAN:  Osmany Barcenas MD    CONSULTS:  Phuong Newell MD, endocrinology.  Wound Care.    HOSPITAL COURSE:  The patient is a 64-year-old female with a history of poorly controlled diabetes mellitus type 2.  Her last hemoglobin A1c done on 03/2017 was 14.2%.  She also has a history of hypercholesterolemia and hypertension.      She presented to the hospital with complaints of perioral numbness associated with a spasm of both her hands and numbness across her chest.  Initial evaluation was remarkable for a severely elevated glucose level to 1335.  CT scan of the head showed no acute findings and rest of the workup did not show evidence of infection.  The patient was started on an Insulin drip initially and rapidly her blood sugars improved as well as her symptoms.  Later, she was transitioned to subq Insulin with the assistance of endocrinology.  Of note, her hemoglobin A1c during this admission was 14.5.  The plan is for the patient to be discharged on Insulin regimen and have close followup with PCP and endocrinology.      PHYSICAL EXAMINATION:   On day of discharge, I had a face-to-face encounter with the patient.  She was asymptomatic.    LUNGS:  Clear to auscultation.    ABDOMEN:  Exam was benign.  NEUROLOGIC:  Exam was nonfocal.      She is currently being discharged home in stable condition with outpatient physical therapy.    DISCHARGE INSTRUCTIONS:  Follow up with primary care physician within 1 week.  Follow up with Dr. Randolph in 1 week.    Time spent on discharge planning and summary:  31  minutes.    LACE(5-9):Moderate            3 LACE Length of Stay    3 LACE Acute Admission    1 LACE Charlson Comorbidity Index Evalution        Criteria that do not apply:    LACE Visits to ED Previous 6 Months          Dictated By: Osmany Barcenas MD  Signing Provider: MD MANDO Degroot/CHARLES ([id number])  DD: 06/22/2017 17:03:06 TD: 06/22/2017 17:23:46    Copy Sent To:      The patient is a 67y Male complaining of back pain general.

## 2022-06-10 NOTE — ED PROVIDER NOTE - ATTENDING APP SHARED VISIT CONTRIBUTION OF CARE
66 y/o male PMH of HTN, HLD presents in ER and with right flank pain x 1 week worsening. 6 months ago was following with urology and told he had kidney stones. did not need intervention. Denies f/c, n/v, cp, sob, abd pain, testicular pain, diarrhea. Denies abd surgery in past  AP - mild cvat on right. possible msk vs. stone. will check urine. pain control. reassess

## 2022-06-10 NOTE — ED PROVIDER NOTE - OBJECTIVE STATEMENT
68 y/o male PMH of HTN , HLD presents in ER and C.O LBP goes to his right hip x 2 days . pain get worse by walking and stand up from slitting W.o any N/T in LE . denies any fall or trauma or Injury. he did not rozina any med for it  denies any hx of back pain ., pain now 6/10 and denies any abd pain or blood in urine . states  his urology been told x 2 month ago he has stone within his kidney . denies any hx of CKD 66 y/o male PMH of HTN , HLD presents in ER and C.O LBP goes to his right hip x 2 days . pain get worse by walking and stand up from slitting W.o any N/T in LE . denies any fall or trauma or Injury. he did not take any med for it  denies any hx of back pain ., pain now 6/10 and denies any abd pain or blood in urine . states  his urology been told x 6month ago he has stone within his kidney . denies any hx of CKD

## 2022-06-10 NOTE — ED ADULT NURSE NOTE - OBJECTIVE STATEMENT
Patient presents to ER C/O back pain for a few days worsening last night, denies any recent injuries, no urinary symptoms, no fever, no N/V/D, denies recent travel/sick contacts.

## 2022-06-10 NOTE — ED PROVIDER NOTE - PATIENT PORTAL LINK FT
You can access the FollowMyHealth Patient Portal offered by [facility] by registering at the following website: [email or web address]. By joining [facility] [facility]’s FollowMyHealth portal, you will also be able to view your health information using other applications (apps) compatible with our system.

## 2022-06-10 NOTE — ED PROVIDER NOTE - CLINICAL SUMMARY MEDICAL DECISION MAKING FREE TEXT BOX
will check the urine and pain control re eval 68 y/o male PMH of HTN , HLD presents in ER and C.O LBP goes to his right hip x 2 days . pain get worse by walking and stand up from slitting W.o any N/T in LE . denies any fall or trauma or Injury. he did not take any med for it  most likely LBP    will check the urine and pain control re apple

## 2022-06-10 NOTE — ED PROVIDER NOTE - PHYSICAL EXAMINATION
Const: AOX3 nontoxic appearing, no apparent respiratory or physical distress. Stable gait   HEENT: NC/AT. Moist mucous membranes.  Eyes: JOANNA. EOMI  Neck: Soft and supple. Full ROM without pain.  Cardiac: Regular rate and regular rhythm. +S1/S2. No murmurs. Peripheral pulses 2+ and symmetric. No LE edema.  Resp: Speaking in full sentences. No evidence of respiratory distress.   Abd: Soft, non-tender, non-distended. Normal bowel sounds in all 4 quadrants. No guarding or rebound.  Back: Spine midline and non-tender. ? right cva  TTP , para spine muscle TTP   Skin: No rashes, abrasions or lacerations.  Neuro: Awake, alert & oriented x 3. Moves all extremities symmetrically.

## 2022-06-10 NOTE — ED PROVIDER NOTE - CARE PROVIDER_API CALL
Reza Connors)  Neurosurgery  [address], [address]  [place] [address]  Phone: [phone]  Fax: [phone]  Follow Up Time:

## 2022-06-11 LAB
CULTURE RESULTS: SIGNIFICANT CHANGE UP
SPECIMEN SOURCE: SIGNIFICANT CHANGE UP

## 2022-06-13 ENCOUNTER — APPOINTMENT (OUTPATIENT)
Dept: PHYSICAL MEDICINE AND REHAB | Facility: CLINIC | Age: 67
End: 2022-06-13
Payer: MEDICARE

## 2022-06-13 VITALS
HEIGHT: 70 IN | SYSTOLIC BLOOD PRESSURE: 134 MMHG | RESPIRATION RATE: 12 BRPM | DIASTOLIC BLOOD PRESSURE: 74 MMHG | WEIGHT: 190 LBS | HEART RATE: 52 BPM | BODY MASS INDEX: 27.2 KG/M2

## 2022-06-13 PROCEDURE — 99213 OFFICE O/P EST LOW 20 MIN: CPT | Mod: GC

## 2022-06-13 NOTE — HISTORY OF PRESENT ILLNESS
[FreeTextEntry1] : Mr. DANIELA TABARES is a 67 year old  male who presents for follow up. At last visit, he was ordered a R L5-S1 TFESI which is pending for later this month. Patient is here due to increase in LBP. Patient had to go to the ER. He was given methocarbamol & naproxen. He was also given Toradol in the ER. Currently the pain is back to basleine. Denies any new symptoms.  \par \par Location:R>L hip/buttock/low back\par Onset:Chronic, years, no specific triggering event\par Provocation/Palliative:Worse with prolonged standing/activity, better with rest\par Quality:Aching, but can be sharp\par Radiation:Can radiate down to his R lateral thigh area\par Severity:minimal with rest, can be mild-moderate with standing\par Timing:Has been stayed about the same. \par \par No bowel/bladder changes. No groin numbness.

## 2022-06-13 NOTE — PHYSICAL EXAM
[FreeTextEntry1] : PE:\par MSK (Back)\par 	Inspection: no gross swelling identified\par 	Palpation: Mild tenderness of the right lower lumbar paraspinals/upper buttocks area\par 	ROM: Mild pain at end lumbar flexion>extension\par 	Strength: 5/5 strength in bilateral lower extremities\par 	Reflexes: 2+ Patella reflex bilaterally, 2+ Achilles reflex bilaterally, negative clonus bilaterally\par 	Sensation: Intact to light touch in bilateral lower extremities\par Special tests:\par SLR:equivocal on right, negative on left\par Facet loading: positive on right

## 2022-06-13 NOTE — ASSESSMENT
[FreeTextEntry1] : Mr. DANIELA TABARES is a 66 year old male who presents with chronic R sided lower back/buttock pain, which was thought to be more from his underlying spondylosis but he did not get significant relief from the MBB.  Patient had to go to the ER recently for flare up of pain. He was given methocarbamol & naproxen. He was also given Toradol in the ER. Currently the pain is back to baseline. Given his significant stenosis on MRI, he may have a radicular component now. Denies any red flag signs. \par \par Will recommend:\par - Patient can continue to take the methocarbamol PRN. Limit the use of naproxen. \par - Again discussed with patient the risks (including but not limited to bleeding, infection, nerve damage, etc), benefits and alternatives to an epidural steroid injection for which patient understands. Will plan for a R L5-S1 TFESI. He will need PCP clearance and COVID PCR done. \par - Advised he can take Tylenol PRN for now, up to 3g/day. \par - Patient still not interested in surgical consultation for his stenosis. \par \par Return for procedure. Patient aware of red flag signs including any changes to their bowel/bladder control, groin numbness or new weakness. Patient knows to seek immediate attention by calling 911 or going to nearest ER if these symptoms appear.

## 2022-06-26 ENCOUNTER — TRANSCRIPTION ENCOUNTER (OUTPATIENT)
Age: 67
End: 2022-06-26

## 2022-06-27 ENCOUNTER — OUTPATIENT (OUTPATIENT)
Dept: OUTPATIENT SERVICES | Facility: HOSPITAL | Age: 67
LOS: 1 days | End: 2022-06-27
Payer: MEDICARE

## 2022-06-27 ENCOUNTER — APPOINTMENT (OUTPATIENT)
Dept: PHYSICAL MEDICINE AND REHAB | Facility: GI CENTER | Age: 67
End: 2022-06-27

## 2022-06-27 DIAGNOSIS — M48.061 SPINAL STENOSIS, LUMBAR REGION WITHOUT NEUROGENIC CLAUDICATION: ICD-10-CM

## 2022-06-27 DIAGNOSIS — M54.16 RADICULOPATHY, LUMBAR REGION: ICD-10-CM

## 2022-06-27 LAB — SARS-COV-2 N GENE NPH QL NAA+PROBE: NOT DETECTED

## 2022-06-27 PROCEDURE — 64483 NJX AA&/STRD TFRM EPI L/S 1: CPT | Mod: RT

## 2022-06-27 PROCEDURE — 64484 NJX AA&/STRD TFRM EPI L/S EA: CPT | Mod: RT

## 2022-06-27 PROCEDURE — 76000 FLUOROSCOPY <1 HR PHYS/QHP: CPT

## 2022-07-22 ENCOUNTER — APPOINTMENT (OUTPATIENT)
Dept: PHYSICAL MEDICINE AND REHAB | Facility: CLINIC | Age: 67
End: 2022-07-22

## 2022-07-22 VITALS
HEIGHT: 70 IN | BODY MASS INDEX: 27.63 KG/M2 | RESPIRATION RATE: 12 BRPM | SYSTOLIC BLOOD PRESSURE: 122 MMHG | DIASTOLIC BLOOD PRESSURE: 69 MMHG | HEART RATE: 66 BPM | WEIGHT: 193 LBS

## 2022-07-22 DIAGNOSIS — M54.16 RADICULOPATHY, LUMBAR REGION: ICD-10-CM

## 2022-07-22 DIAGNOSIS — M47.816 SPONDYLOSIS W/OUT MYELOPATHY OR RADICULOPATHY, LUMBAR REGION: ICD-10-CM

## 2022-07-22 DIAGNOSIS — M48.061 SPINAL STENOSIS, LUMBAR REGION WITHOUT NEUROGENIC CLAUDICATION: ICD-10-CM

## 2022-07-22 DIAGNOSIS — M54.50 LOW BACK PAIN, UNSPECIFIED: ICD-10-CM

## 2022-07-22 PROCEDURE — 99213 OFFICE O/P EST LOW 20 MIN: CPT

## 2022-07-22 NOTE — ASSESSMENT
[FreeTextEntry1] : Mr. DANIELA TABARES is a 66 year old male who presents with chronic R sided lower back/buttock pain, which was thought to be more from his underlying spondylosis but he did not get significant relief from the MBB.  Patient now s/p a R L5-S1 TFESI with 85% relief.. Denies any red flag signs. \par \par Will recommend:\par - Patient can continue to take the methocarbamol PRN. Limit the use of naproxen, for which he is not usually taking \par - Advised he can take Tylenol PRN for now, up to 3g/day. \par \par Return as needed. Patient aware of red flag signs including any changes to their bowel/bladder control, groin numbness or new weakness. Patient knows to seek immediate attention by calling 911 or going to nearest ER if these symptoms appear.

## 2022-07-22 NOTE — HISTORY OF PRESENT ILLNESS
[FreeTextEntry1] : Mr. DANIELA TABARES is a 67 year old  male who presents for follow up. Since last visit, he underwent a R L5-S1 TFESI on 6/27/22. He got 85% relief from the injection. Denies any side effects from the injection. Denies any adverse effects. \par \par Location:R>L hip/buttock/low back\par Onset:Chronic, years, no specific triggering event\par Provocation/Palliative:Worse with prolonged standing/activity, better with rest\par Quality:Aching, but can be sharp\par Radiation:Can radiate down to his R lateral thigh area, less often now after injection\par Severity:minimal with rest, can be mild with standing\par Timing:Much improved s/p injection\par \par No bowel/bladder changes. No groin numbness.

## 2022-07-22 NOTE — PHYSICAL EXAM
[FreeTextEntry1] : PE:\par MSK (Back)\par 	Inspection: no gross swelling identified, no erythema or swelling around injection site\par 	Palpation: No tenderness of the right lower lumbar paraspinals/upper buttocks area\par 	ROM: Mild pain at end lumbar flexion>extension\par 	Strength: 5/5 strength in bilateral lower extremities\par 	Reflexes: 2+ Patella reflex bilaterally, 2+ Achilles reflex bilaterally, negative clonus bilaterally\par 	Sensation: Intact to light touch in bilateral lower extremities\par Special tests:\par SLR:negative bilaterally\par Facet loading: positive on right

## 2023-10-24 ENCOUNTER — OFFICE (OUTPATIENT)
Dept: URBAN - METROPOLITAN AREA CLINIC 115 | Facility: CLINIC | Age: 68
Setting detail: OPHTHALMOLOGY
End: 2023-10-24
Payer: MEDICARE

## 2023-10-24 DIAGNOSIS — H35.371: ICD-10-CM

## 2023-10-24 DIAGNOSIS — H04.123: ICD-10-CM

## 2023-10-24 DIAGNOSIS — H35.033: ICD-10-CM

## 2023-10-24 DIAGNOSIS — H04.122: ICD-10-CM

## 2023-10-24 DIAGNOSIS — H04.121: ICD-10-CM

## 2023-10-24 DIAGNOSIS — H01.005: ICD-10-CM

## 2023-10-24 DIAGNOSIS — H01.002: ICD-10-CM

## 2023-10-24 PROCEDURE — 92014 COMPRE OPH EXAM EST PT 1/>: CPT | Performed by: OPHTHALMOLOGY

## 2023-10-24 PROCEDURE — 83861 MICROFLUID ANALY TEARS: CPT | Mod: QW,RT | Performed by: OPHTHALMOLOGY

## 2023-10-24 PROCEDURE — 92250 FUNDUS PHOTOGRAPHY W/I&R: CPT | Performed by: OPHTHALMOLOGY

## 2023-10-24 PROCEDURE — 83861 MICROFLUID ANALY TEARS: CPT | Mod: QW,LT | Performed by: OPHTHALMOLOGY

## 2023-10-24 ASSESSMENT — REFRACTION_CURRENTRX
OD_OVR_VA: 20/
OD_VPRISM_DIRECTION: SV
OD_OVR_VA: 20/
OS_SPHERE: +1.00
OD_ADD: +2.75
OD_SPHERE: +1.00
OS_CYLINDER: -1.25
OS_VPRISM_DIRECTION: SV
OS_CYLINDER: SPH
OS_VPRISM_DIRECTION: SV
OD_CYLINDER: -0.75
OS_ADD: +2.75
OS_OVR_VA: 20/
OS_OVR_VA: 20/
OD_VPRISM_DIRECTION: SV
OS_AXIS: 019
OD_AXIS: 168
OD_CYLINDER: 0SPH

## 2023-10-24 ASSESSMENT — LID EXAM ASSESSMENTS
OD_TRICHIASIS: RLL T
OD_BLEPHARITIS: RLL T
OS_BLEPHARITIS: LLL T

## 2023-10-24 ASSESSMENT — AXIALLENGTH_DERIVED
OD_AL: 24.0125
OS_AL: 23.5668
OD_AL: 23.912
OS_AL: 23.912

## 2023-10-24 ASSESSMENT — REFRACTION_MANIFEST
OD_ADD: +2.50
OS_VA2: 20/20
OD_AXIS: 180
OS_ADD: +2.50
OD_SPHERE: +0.75
OD_VA1: 20/25
OS_SPHERE: +1.50
OS_VA1: 20/40
OS_CYLINDER: -0.75
OD_CYLINDER: -1.00
OS_SPHERE: PLANO
OD_SPHERE: PLANO
OD_VA2: 20/20
OS_ADD: +2.50
OD_ADD: +2.50
OS_AXIS: 180

## 2023-10-24 ASSESSMENT — KERATOMETRY
OD_AXISANGLE_DEGREES: 087
OS_AXISANGLE_DEGREES: 091
OS_K1POWER_DIOPTERS: 41.75
OD_K1POWER_DIOPTERS: 41.75
OS_K2POWER_DIOPTERS: 43.00
OD_K2POWER_DIOPTERS: 43.00

## 2023-10-24 ASSESSMENT — SPHEQUIV_DERIVED
OS_SPHEQUIV: 0.25
OS_SPHEQUIV: 1.125
OD_SPHEQUIV: 0.25
OD_SPHEQUIV: 0

## 2023-10-24 ASSESSMENT — REFRACTION_AUTOREFRACTION
OS_CYLINDER: -1.00
OD_CYLINDER: -0.50
OS_AXIS: 008
OD_SPHERE: +0.25
OS_SPHERE: +0.75
OD_AXIS: 162

## 2023-10-24 ASSESSMENT — TONOMETRY
OS_IOP_MMHG: 12
OD_IOP_MMHG: 12

## 2023-10-24 ASSESSMENT — VISUAL ACUITY
OS_BCVA: 20/25+
OD_BCVA: 20/25+

## 2023-10-24 ASSESSMENT — SUPERFICIAL PUNCTATE KERATITIS (SPK)
OD_SPK: T
OS_SPK: T

## 2023-10-24 ASSESSMENT — CONFRONTATIONAL VISUAL FIELD TEST (CVF)
OS_FINDINGS: FULL
OD_FINDINGS: FULL

## 2023-12-06 ENCOUNTER — OFFICE (OUTPATIENT)
Dept: URBAN - METROPOLITAN AREA CLINIC 115 | Facility: CLINIC | Age: 68
Setting detail: OPHTHALMOLOGY
End: 2023-12-06
Payer: MEDICARE

## 2023-12-06 DIAGNOSIS — H04.121: ICD-10-CM

## 2023-12-06 DIAGNOSIS — H43.811: ICD-10-CM

## 2023-12-06 DIAGNOSIS — H04.123: ICD-10-CM

## 2023-12-06 DIAGNOSIS — H01.005: ICD-10-CM

## 2023-12-06 DIAGNOSIS — H01.002: ICD-10-CM

## 2023-12-06 DIAGNOSIS — H35.033: ICD-10-CM

## 2023-12-06 DIAGNOSIS — H04.122: ICD-10-CM

## 2023-12-06 PROCEDURE — 83861 MICROFLUID ANALY TEARS: CPT | Mod: QW,LT | Performed by: OPHTHALMOLOGY

## 2023-12-06 PROCEDURE — 99213 OFFICE O/P EST LOW 20 MIN: CPT | Performed by: OPHTHALMOLOGY

## 2023-12-06 PROCEDURE — 92250 FUNDUS PHOTOGRAPHY W/I&R: CPT | Performed by: OPHTHALMOLOGY

## 2023-12-06 PROCEDURE — 83861 MICROFLUID ANALY TEARS: CPT | Mod: QW,RT | Performed by: OPHTHALMOLOGY

## 2023-12-06 ASSESSMENT — REFRACTION_MANIFEST
OD_VA2: 20/20
OS_CYLINDER: -0.75
OS_VA2: 20/20
OD_VA1: 20/25
OD_AXIS: 180
OS_VA1: 20/40
OD_CYLINDER: -1.00
OS_SPHERE: +1.50
OD_SPHERE: +0.75
OD_ADD: +2.50
OS_SPHERE: PLANO
OD_ADD: +2.50
OD_SPHERE: PLANO
OS_ADD: +2.50
OS_AXIS: 180
OS_ADD: +2.50

## 2023-12-06 ASSESSMENT — SPHEQUIV_DERIVED
OD_SPHEQUIV: 0.25
OS_SPHEQUIV: 0.125
OS_SPHEQUIV: 1.125
OD_SPHEQUIV: 0.25

## 2023-12-06 ASSESSMENT — REFRACTION_CURRENTRX
OD_VPRISM_DIRECTION: SV
OS_VPRISM_DIRECTION: SV
OD_OVR_VA: 20/
OS_ADD: +2.75
OD_AXIS: 168
OD_ADD: +2.75
OD_CYLINDER: -0.75
OS_CYLINDER: -1.25
OD_SPHERE: +1.00
OS_CYLINDER: SPH
OD_VPRISM_DIRECTION: SV
OD_OVR_VA: 20/
OS_OVR_VA: 20/
OS_VPRISM_DIRECTION: SV
OS_AXIS: 019
OS_OVR_VA: 20/
OS_SPHERE: +1.00
OD_CYLINDER: 0SPH

## 2023-12-06 ASSESSMENT — REFRACTION_AUTOREFRACTION
OD_CYLINDER: -0.50
OD_SPHERE: +0.50
OS_SPHERE: +0.50
OD_AXIS: 178
OS_AXIS: 013
OS_CYLINDER: -0.75

## 2023-12-06 ASSESSMENT — LID EXAM ASSESSMENTS
OD_BLEPHARITIS: RLL T
OS_BLEPHARITIS: LLL T
OD_TRICHIASIS: RLL T

## 2023-12-06 ASSESSMENT — CONFRONTATIONAL VISUAL FIELD TEST (CVF)
OD_FINDINGS: FULL
OS_FINDINGS: FULL

## 2023-12-06 ASSESSMENT — SUPERFICIAL PUNCTATE KERATITIS (SPK)
OD_SPK: T
OS_SPK: T

## 2024-01-09 ENCOUNTER — OFFICE (OUTPATIENT)
Dept: URBAN - METROPOLITAN AREA CLINIC 115 | Facility: CLINIC | Age: 69
Setting detail: OPHTHALMOLOGY
End: 2024-01-09
Payer: MEDICARE

## 2024-01-09 DIAGNOSIS — H04.122: ICD-10-CM

## 2024-01-09 DIAGNOSIS — H04.121: ICD-10-CM

## 2024-01-09 DIAGNOSIS — H35.033: ICD-10-CM

## 2024-01-09 DIAGNOSIS — H01.002: ICD-10-CM

## 2024-01-09 DIAGNOSIS — Z96.1: ICD-10-CM

## 2024-01-09 DIAGNOSIS — H04.123: ICD-10-CM

## 2024-01-09 DIAGNOSIS — H01.005: ICD-10-CM

## 2024-01-09 DIAGNOSIS — H35.371: ICD-10-CM

## 2024-01-09 DIAGNOSIS — H26.493: ICD-10-CM

## 2024-01-09 DIAGNOSIS — H43.811: ICD-10-CM

## 2024-01-09 PROCEDURE — 92134 CPTRZ OPH DX IMG PST SGM RTA: CPT | Performed by: OPHTHALMOLOGY

## 2024-01-09 PROCEDURE — 92014 COMPRE OPH EXAM EST PT 1/>: CPT | Performed by: OPHTHALMOLOGY

## 2024-01-09 ASSESSMENT — LID EXAM ASSESSMENTS
OD_BLEPHARITIS: RLL T
OD_TRICHIASIS: RLL T
OS_BLEPHARITIS: LLL T

## 2024-01-09 ASSESSMENT — REFRACTION_CURRENTRX
OS_VPRISM_DIRECTION: SV
OD_CYLINDER: 0SPH
OD_VPRISM_DIRECTION: SV
OD_OVR_VA: 20/
OS_ADD: +2.75
OD_SPHERE: +1.00
OS_SPHERE: +1.00
OS_OVR_VA: 20/
OS_AXIS: 019
OS_OVR_VA: 20/
OD_OVR_VA: 20/
OD_VPRISM_DIRECTION: SV
OD_CYLINDER: -0.75
OS_CYLINDER: SPH
OD_AXIS: 168
OD_ADD: +2.75
OS_CYLINDER: -1.25
OS_VPRISM_DIRECTION: SV

## 2024-01-09 ASSESSMENT — REFRACTION_MANIFEST
OS_VA1: 20/40
OD_AXIS: 180
OS_VA2: 20/20
OS_SPHERE: PLANO
OS_CYLINDER: -0.75
OD_VA1: 20/25
OD_VA2: 20/20
OD_CYLINDER: -1.00
OD_ADD: +2.50
OS_AXIS: 180
OS_SPHERE: +1.50
OD_ADD: +2.50
OS_ADD: +2.50
OD_SPHERE: +0.75
OS_ADD: +2.50
OD_SPHERE: PLANO

## 2024-01-09 ASSESSMENT — REFRACTION_AUTOREFRACTION
OD_SPHERE: +0.25
OS_SPHERE: +0.50
OD_AXIS: 011
OD_CYLINDER: -0.70
OS_AXIS: 013
OS_CYLINDER: -0.75

## 2024-01-09 ASSESSMENT — CONFRONTATIONAL VISUAL FIELD TEST (CVF)
OS_FINDINGS: FULL
OD_FINDINGS: FULL

## 2024-01-09 ASSESSMENT — SUPERFICIAL PUNCTATE KERATITIS (SPK)
OS_SPK: T
OD_SPK: T

## 2024-01-09 ASSESSMENT — SPHEQUIV_DERIVED
OD_SPHEQUIV: -0.1
OS_SPHEQUIV: 0.125
OS_SPHEQUIV: 1.125
OD_SPHEQUIV: 0.25

## 2024-06-20 ENCOUNTER — OFFICE (OUTPATIENT)
Dept: URBAN - METROPOLITAN AREA CLINIC 112 | Facility: CLINIC | Age: 69
Setting detail: OPHTHALMOLOGY
End: 2024-06-20
Payer: MEDICARE

## 2024-06-20 DIAGNOSIS — H04.121: ICD-10-CM

## 2024-06-20 DIAGNOSIS — H35.033: ICD-10-CM

## 2024-06-20 DIAGNOSIS — H04.122: ICD-10-CM

## 2024-06-20 DIAGNOSIS — H43.392: ICD-10-CM

## 2024-06-20 PROCEDURE — 83861 MICROFLUID ANALY TEARS: CPT | Mod: QW,RT | Performed by: OPHTHALMOLOGY

## 2024-06-20 PROCEDURE — 83861 MICROFLUID ANALY TEARS: CPT | Mod: QW,LT | Performed by: OPHTHALMOLOGY

## 2024-06-20 PROCEDURE — 92250 FUNDUS PHOTOGRAPHY W/I&R: CPT | Performed by: OPHTHALMOLOGY

## 2024-06-20 PROCEDURE — 99213 OFFICE O/P EST LOW 20 MIN: CPT | Performed by: OPHTHALMOLOGY

## 2024-06-20 ASSESSMENT — LID EXAM ASSESSMENTS
OD_BLEPHARITIS: RLL T
OD_TRICHIASIS: RLL T
OS_BLEPHARITIS: LLL T

## 2024-06-20 ASSESSMENT — CONFRONTATIONAL VISUAL FIELD TEST (CVF)
OD_FINDINGS: FULL
OS_FINDINGS: FULL

## 2024-07-31 ENCOUNTER — OFFICE (OUTPATIENT)
Dept: URBAN - METROPOLITAN AREA CLINIC 112 | Facility: CLINIC | Age: 69
Setting detail: OPHTHALMOLOGY
End: 2024-07-31
Payer: MEDICARE

## 2024-07-31 DIAGNOSIS — H01.002: ICD-10-CM

## 2024-07-31 DIAGNOSIS — H43.392: ICD-10-CM

## 2024-07-31 DIAGNOSIS — H04.123: ICD-10-CM

## 2024-07-31 DIAGNOSIS — H35.033: ICD-10-CM

## 2024-07-31 DIAGNOSIS — H01.005: ICD-10-CM

## 2024-07-31 PROCEDURE — 92134 CPTRZ OPH DX IMG PST SGM RTA: CPT | Performed by: OPHTHALMOLOGY

## 2024-07-31 PROCEDURE — 99213 OFFICE O/P EST LOW 20 MIN: CPT | Performed by: OPHTHALMOLOGY

## 2024-07-31 ASSESSMENT — LID EXAM ASSESSMENTS
OD_TRICHIASIS: RLL T
OD_BLEPHARITIS: RLL T
OS_BLEPHARITIS: LLL T

## 2024-07-31 ASSESSMENT — CONFRONTATIONAL VISUAL FIELD TEST (CVF)
OS_FINDINGS: FULL
OD_FINDINGS: FULL

## 2024-09-12 NOTE — ED PROVIDER NOTE - CARE PLAN
Abdomen soft, non-tender and non-distended, no rebound, no guarding and no masses. no hepatosplenomegaly. 1 Principal Discharge DX:	Lower back pain

## 2024-11-06 ENCOUNTER — OFFICE (OUTPATIENT)
Dept: URBAN - METROPOLITAN AREA CLINIC 115 | Facility: CLINIC | Age: 69
Setting detail: OPHTHALMOLOGY
End: 2024-11-06
Payer: MEDICARE

## 2024-11-06 DIAGNOSIS — H35.033: ICD-10-CM

## 2024-11-06 DIAGNOSIS — Z96.1: ICD-10-CM

## 2024-11-06 DIAGNOSIS — H35.371: ICD-10-CM

## 2024-11-06 DIAGNOSIS — H04.122: ICD-10-CM

## 2024-11-06 DIAGNOSIS — H04.121: ICD-10-CM

## 2024-11-06 DIAGNOSIS — H04.123: ICD-10-CM

## 2024-11-06 DIAGNOSIS — H43.811: ICD-10-CM

## 2024-11-06 DIAGNOSIS — H01.005: ICD-10-CM

## 2024-11-06 DIAGNOSIS — H01.002: ICD-10-CM

## 2024-11-06 DIAGNOSIS — H43.392: ICD-10-CM

## 2024-11-06 DIAGNOSIS — H26.493: ICD-10-CM

## 2024-11-06 PROCEDURE — 92014 COMPRE OPH EXAM EST PT 1/>: CPT | Performed by: OPHTHALMOLOGY

## 2024-11-06 PROCEDURE — 83861 MICROFLUID ANALY TEARS: CPT | Mod: QW,RT | Performed by: OPHTHALMOLOGY

## 2024-11-06 PROCEDURE — 83861 MICROFLUID ANALY TEARS: CPT | Mod: QW,LT | Performed by: OPHTHALMOLOGY

## 2024-11-06 ASSESSMENT — REFRACTION_CURRENTRX
OS_OVR_VA: 20/
OS_VPRISM_DIRECTION: SV
OS_AXIS: 019
OS_CYLINDER: -1.25
OD_OVR_VA: 20/
OD_VPRISM_DIRECTION: SV
OD_CYLINDER: 0SPH
OD_AXIS: 168
OS_SPHERE: +1.00
OD_SPHERE: +1.00
OS_VPRISM_DIRECTION: SV
OD_CYLINDER: -0.75
OD_OVR_VA: 20/
OS_CYLINDER: SPH
OD_VPRISM_DIRECTION: SV
OS_OVR_VA: 20/
OS_ADD: +2.75
OD_ADD: +2.75

## 2024-11-06 ASSESSMENT — KERATOMETRY
OS_K1POWER_DIOPTERS: 41.75
OD_AXISANGLE_DEGREES: 087
OD_K1POWER_DIOPTERS: 41.75
OS_K2POWER_DIOPTERS: 43.00
OS_AXISANGLE_DEGREES: 091
OD_K2POWER_DIOPTERS: 43.00

## 2024-11-06 ASSESSMENT — TONOMETRY
OS_IOP_MMHG: 11
OD_IOP_MMHG: 11

## 2024-11-06 ASSESSMENT — CONFRONTATIONAL VISUAL FIELD TEST (CVF)
OS_FINDINGS: FULL
OD_FINDINGS: FULL

## 2024-11-06 ASSESSMENT — REFRACTION_MANIFEST
OS_ADD: +2.50
OD_SPHERE: PLANO
OD_VA1: 20/25
OS_VA1: 20/40
OD_VA2: 20/20
OD_CYLINDER: -1.00
OD_ADD: +2.50
OD_AXIS: 180
OS_SPHERE: +1.50
OS_CYLINDER: -0.75
OS_VA2: 20/20
OD_SPHERE: +0.75
OD_ADD: +2.50
OS_SPHERE: PLANO
OS_AXIS: 180
OS_ADD: +2.50

## 2024-11-06 ASSESSMENT — REFRACTION_AUTOREFRACTION
OD_CYLINDER: -0.75
OS_SPHERE: +0.75
OD_SPHERE: +0.50
OD_AXIS: 163
OS_AXIS: 021
OS_CYLINDER: -1.00

## 2024-11-06 ASSESSMENT — LID EXAM ASSESSMENTS
OD_BLEPHARITIS: RLL T
OD_TRICHIASIS: RLL T
OS_BLEPHARITIS: LLL T

## 2024-11-06 ASSESSMENT — VISUAL ACUITY
OD_BCVA: 20/25-1
OS_BCVA: 20/20

## 2024-11-06 ASSESSMENT — SUPERFICIAL PUNCTATE KERATITIS (SPK)
OD_SPK: T
OS_SPK: T

## 2024-12-24 PROBLEM — F10.90 ALCOHOL USE: Status: ACTIVE | Noted: 2021-08-05

## 2025-01-22 ENCOUNTER — OFFICE (OUTPATIENT)
Dept: URBAN - METROPOLITAN AREA CLINIC 112 | Facility: CLINIC | Age: 70
Setting detail: OPHTHALMOLOGY
End: 2025-01-22
Payer: MEDICARE

## 2025-01-22 DIAGNOSIS — H04.122: ICD-10-CM

## 2025-01-22 DIAGNOSIS — H01.002: ICD-10-CM

## 2025-01-22 DIAGNOSIS — H35.371: ICD-10-CM

## 2025-01-22 DIAGNOSIS — H26.493: ICD-10-CM

## 2025-01-22 DIAGNOSIS — Z96.1: ICD-10-CM

## 2025-01-22 DIAGNOSIS — H43.392: ICD-10-CM

## 2025-01-22 DIAGNOSIS — H01.005: ICD-10-CM

## 2025-01-22 DIAGNOSIS — H04.121: ICD-10-CM

## 2025-01-22 DIAGNOSIS — H35.033: ICD-10-CM

## 2025-01-22 DIAGNOSIS — H04.123: ICD-10-CM

## 2025-01-22 DIAGNOSIS — H43.811: ICD-10-CM

## 2025-01-22 PROCEDURE — 92014 COMPRE OPH EXAM EST PT 1/>: CPT | Performed by: OPHTHALMOLOGY

## 2025-01-22 ASSESSMENT — REFRACTION_AUTOREFRACTION
OD_SPHERE: +0.50
OD_AXIS: 161
OS_AXIS: 012
OD_CYLINDER: -0.75
OS_CYLINDER: -0.75
OS_SPHERE: +0.50

## 2025-01-22 ASSESSMENT — KERATOMETRY
OD_K1POWER_DIOPTERS: 41.75
OS_K2POWER_DIOPTERS: 43.00
OS_K1POWER_DIOPTERS: 41.75
OS_AXISANGLE_DEGREES: 091
OD_AXISANGLE_DEGREES: 087
OD_K2POWER_DIOPTERS: 43.00

## 2025-01-22 ASSESSMENT — TONOMETRY
OS_IOP_MMHG: 13
OD_IOP_MMHG: 14

## 2025-01-22 ASSESSMENT — LID EXAM ASSESSMENTS
OD_BLEPHARITIS: RLL T
OS_BLEPHARITIS: LLL T
OD_TRICHIASIS: RLL T

## 2025-01-22 ASSESSMENT — REFRACTION_CURRENTRX
OD_SPHERE: +1.00
OS_OVR_VA: 20/
OS_CYLINDER: -1.25
OD_ADD: +2.75
OS_CYLINDER: SPH
OS_SPHERE: +1.00
OS_VPRISM_DIRECTION: SV
OD_OVR_VA: 20/
OD_CYLINDER: -0.75
OD_OVR_VA: 20/
OS_ADD: +2.75
OD_AXIS: 168
OS_AXIS: 019
OD_VPRISM_DIRECTION: SV
OS_OVR_VA: 20/
OS_VPRISM_DIRECTION: SV
OD_VPRISM_DIRECTION: SV
OD_CYLINDER: 0SPH

## 2025-01-22 ASSESSMENT — REFRACTION_MANIFEST
OS_ADD: +2.50
OD_SPHERE: PLANO
OS_CYLINDER: -0.75
OD_ADD: +2.50
OS_VA1: 20/40
OS_ADD: +2.50
OS_SPHERE: +1.50
OS_AXIS: 180
OD_SPHERE: +0.75
OD_CYLINDER: -1.00
OD_VA1: 20/25
OD_AXIS: 180
OS_SPHERE: PLANO
OS_VA2: 20/20
OD_ADD: +2.50
OD_VA2: 20/20

## 2025-01-22 ASSESSMENT — VISUAL ACUITY
OS_BCVA: 20/20
OD_BCVA: 20/20

## 2025-01-22 ASSESSMENT — SUPERFICIAL PUNCTATE KERATITIS (SPK)
OD_SPK: T
OS_SPK: T

## 2025-01-22 ASSESSMENT — CONFRONTATIONAL VISUAL FIELD TEST (CVF)
OS_FINDINGS: FULL
OD_FINDINGS: FULL

## 2025-02-20 ENCOUNTER — OFFICE (OUTPATIENT)
Dept: URBAN - METROPOLITAN AREA CLINIC 112 | Facility: CLINIC | Age: 70
Setting detail: OPHTHALMOLOGY
End: 2025-02-20
Payer: MEDICARE

## 2025-02-20 DIAGNOSIS — H43.392: ICD-10-CM

## 2025-02-20 DIAGNOSIS — H01.002: ICD-10-CM

## 2025-02-20 DIAGNOSIS — Z96.1: ICD-10-CM

## 2025-02-20 DIAGNOSIS — H04.123: ICD-10-CM

## 2025-02-20 DIAGNOSIS — H04.121: ICD-10-CM

## 2025-02-20 DIAGNOSIS — H35.371: ICD-10-CM

## 2025-02-20 DIAGNOSIS — H01.005: ICD-10-CM

## 2025-02-20 DIAGNOSIS — H04.122: ICD-10-CM

## 2025-02-20 DIAGNOSIS — H43.811: ICD-10-CM

## 2025-02-20 DIAGNOSIS — H26.493: ICD-10-CM

## 2025-02-20 DIAGNOSIS — H35.033: ICD-10-CM

## 2025-02-20 PROCEDURE — 92014 COMPRE OPH EXAM EST PT 1/>: CPT | Performed by: OPHTHALMOLOGY

## 2025-02-20 PROCEDURE — 92134 CPTRZ OPH DX IMG PST SGM RTA: CPT | Performed by: OPHTHALMOLOGY

## 2025-02-20 ASSESSMENT — REFRACTION_CURRENTRX
OD_OVR_VA: 20/
OS_SPHERE: +1.00
OD_VPRISM_DIRECTION: SV
OS_VPRISM_DIRECTION: SV
OD_SPHERE: +1.00
OS_CYLINDER: SPH
OD_ADD: +2.75
OS_CYLINDER: -1.25
OD_VPRISM_DIRECTION: SV
OD_CYLINDER: -0.75
OS_OVR_VA: 20/
OD_AXIS: 168
OS_OVR_VA: 20/
OS_AXIS: 019
OD_CYLINDER: 0SPH
OD_OVR_VA: 20/
OS_ADD: +2.75
OS_VPRISM_DIRECTION: SV

## 2025-02-20 ASSESSMENT — KERATOMETRY
OD_K1POWER_DIOPTERS: 41.75
OS_K1POWER_DIOPTERS: 41.75
OS_AXISANGLE_DEGREES: 091
OD_K2POWER_DIOPTERS: 43.00
OS_K2POWER_DIOPTERS: 43.00
OD_AXISANGLE_DEGREES: 087

## 2025-02-20 ASSESSMENT — CONFRONTATIONAL VISUAL FIELD TEST (CVF)
OS_FINDINGS: FULL
OD_FINDINGS: FULL

## 2025-02-20 ASSESSMENT — REFRACTION_MANIFEST
OS_VA1: 20/40
OD_VA1: 20/25
OD_ADD: +2.50
OS_AXIS: 180
OD_CYLINDER: -1.00
OD_SPHERE: PLANO
OS_SPHERE: PLANO
OD_VA2: 20/20
OS_CYLINDER: -0.75
OD_ADD: +2.50
OS_ADD: +2.50
OD_SPHERE: +0.75
OD_AXIS: 180
OS_SPHERE: +1.50
OS_VA2: 20/20
OS_ADD: +2.50

## 2025-02-20 ASSESSMENT — LID EXAM ASSESSMENTS
OD_TRICHIASIS: RLL T
OD_BLEPHARITIS: RLL T
OS_BLEPHARITIS: LLL T

## 2025-02-20 ASSESSMENT — TONOMETRY
OD_IOP_MMHG: 12
OS_IOP_MMHG: 14

## 2025-02-20 ASSESSMENT — VISUAL ACUITY
OD_BCVA: 20/20-1
OS_BCVA: 20/20

## 2025-02-20 ASSESSMENT — SUPERFICIAL PUNCTATE KERATITIS (SPK)
OS_SPK: T
OD_SPK: T

## 2025-02-20 ASSESSMENT — REFRACTION_AUTOREFRACTION
OS_AXIS: 021
OD_AXIS: 157
OD_CYLINDER: -1.25
OS_CYLINDER: -0.75
OS_SPHERE: +0.50
OD_SPHERE: +0.25

## 2025-05-07 ENCOUNTER — OFFICE (OUTPATIENT)
Dept: URBAN - METROPOLITAN AREA CLINIC 115 | Facility: CLINIC | Age: 70
Setting detail: OPHTHALMOLOGY
End: 2025-05-07
Payer: MEDICARE

## 2025-05-07 DIAGNOSIS — H01.002: ICD-10-CM

## 2025-05-07 DIAGNOSIS — Z96.1: ICD-10-CM

## 2025-05-07 DIAGNOSIS — H04.121: ICD-10-CM

## 2025-05-07 DIAGNOSIS — H04.122: ICD-10-CM

## 2025-05-07 DIAGNOSIS — H43.811: ICD-10-CM

## 2025-05-07 DIAGNOSIS — H26.493: ICD-10-CM

## 2025-05-07 DIAGNOSIS — H35.033: ICD-10-CM

## 2025-05-07 DIAGNOSIS — H04.123: ICD-10-CM

## 2025-05-07 DIAGNOSIS — H01.005: ICD-10-CM

## 2025-05-07 DIAGNOSIS — H35.371: ICD-10-CM

## 2025-05-07 DIAGNOSIS — H43.392: ICD-10-CM

## 2025-05-07 PROCEDURE — 92250 FUNDUS PHOTOGRAPHY W/I&R: CPT | Performed by: OPHTHALMOLOGY

## 2025-05-07 PROCEDURE — 83861 MICROFLUID ANALY TEARS: CPT | Mod: QW,LT | Performed by: OPHTHALMOLOGY

## 2025-05-07 PROCEDURE — 83861 MICROFLUID ANALY TEARS: CPT | Mod: QW,RT | Performed by: OPHTHALMOLOGY

## 2025-05-07 PROCEDURE — 92014 COMPRE OPH EXAM EST PT 1/>: CPT | Performed by: OPHTHALMOLOGY

## 2025-05-07 ASSESSMENT — REFRACTION_CURRENTRX
OS_SPHERE: +1.00
OS_VPRISM_DIRECTION: SV
OS_OVR_VA: 20/
OS_VPRISM_DIRECTION: SV
OS_CYLINDER: -1.25
OD_ADD: +2.75
OS_ADD: +2.75
OD_CYLINDER: -0.75
OD_VPRISM_DIRECTION: SV
OS_CYLINDER: SPH
OD_CYLINDER: 0SPH
OD_OVR_VA: 20/
OS_AXIS: 019
OD_SPHERE: +1.00
OD_VPRISM_DIRECTION: SV
OD_OVR_VA: 20/
OS_OVR_VA: 20/
OD_AXIS: 168

## 2025-05-07 ASSESSMENT — KERATOMETRY
OD_K1POWER_DIOPTERS: 41.75
OS_AXISANGLE_DEGREES: 091
OD_K2POWER_DIOPTERS: 43.00
OD_AXISANGLE_DEGREES: 087
OS_K2POWER_DIOPTERS: 43.00
OS_K1POWER_DIOPTERS: 41.75

## 2025-05-07 ASSESSMENT — REFRACTION_MANIFEST
OS_VA2: 20/20
OD_ADD: +2.50
OS_ADD: +2.50
OD_CYLINDER: -1.00
OD_AXIS: 180
OD_SPHERE: +0.75
OD_ADD: +2.50
OD_VA2: 20/20
OS_VA1: 20/40
OS_AXIS: 180
OS_SPHERE: +1.50
OS_SPHERE: PLANO
OD_SPHERE: PLANO
OS_CYLINDER: -0.75
OS_ADD: +2.50
OD_VA1: 20/25

## 2025-05-07 ASSESSMENT — LID EXAM ASSESSMENTS
OD_BLEPHARITIS: RLL T
OS_BLEPHARITIS: LLL T
OD_TRICHIASIS: RLL T

## 2025-05-07 ASSESSMENT — SUPERFICIAL PUNCTATE KERATITIS (SPK)
OS_SPK: T
OD_SPK: T

## 2025-05-07 ASSESSMENT — VISUAL ACUITY
OD_BCVA: 20/20-1
OS_BCVA: 20/20

## 2025-05-07 ASSESSMENT — REFRACTION_AUTOREFRACTION
OD_AXIS: 157
OD_CYLINDER: -1.25
OS_SPHERE: +0.50
OD_SPHERE: +0.25
OS_CYLINDER: -0.75
OS_AXIS: 021

## 2025-05-07 ASSESSMENT — CONFRONTATIONAL VISUAL FIELD TEST (CVF)
OS_FINDINGS: FULL
OD_FINDINGS: FULL

## 2025-05-07 ASSESSMENT — TONOMETRY
OS_IOP_MMHG: 16
OD_IOP_MMHG: 16

## 2025-07-10 ENCOUNTER — APPOINTMENT (OUTPATIENT)
Dept: ORTHOPEDIC SURGERY | Facility: CLINIC | Age: 70
End: 2025-07-10
Payer: MEDICARE

## 2025-07-10 PROBLEM — S83.242A TEAR OF MEDIAL MENISCUS OF LEFT KNEE, CURRENT, UNSPECIFIED TEAR TYPE, INITIAL ENCOUNTER: Status: ACTIVE | Noted: 2025-07-10

## 2025-07-10 PROBLEM — M17.12 PRIMARY OSTEOARTHRITIS OF LEFT KNEE: Status: ACTIVE | Noted: 2025-07-10

## 2025-07-10 PROCEDURE — 73562 X-RAY EXAM OF KNEE 3: CPT | Mod: LT

## 2025-07-10 PROCEDURE — 20610 DRAIN/INJ JOINT/BURSA W/O US: CPT | Mod: LT

## 2025-07-10 PROCEDURE — 99204 OFFICE O/P NEW MOD 45 MIN: CPT | Mod: 25

## 2025-08-21 ENCOUNTER — APPOINTMENT (OUTPATIENT)
Dept: ORTHOPEDIC SURGERY | Facility: CLINIC | Age: 70
End: 2025-08-21